# Patient Record
Sex: FEMALE | Race: WHITE | Employment: PART TIME | URBAN - METROPOLITAN AREA
[De-identification: names, ages, dates, MRNs, and addresses within clinical notes are randomized per-mention and may not be internally consistent; named-entity substitution may affect disease eponyms.]

---

## 2017-11-09 ENCOUNTER — HOSPITAL ENCOUNTER (OUTPATIENT)
Dept: GENERAL RADIOLOGY | Age: 62
Discharge: HOME | End: 2017-11-09
Attending: FAMILY MEDICINE
Payer: COMMERCIAL

## 2017-11-09 DIAGNOSIS — M54.50 LOW BACK PAIN: ICD-10-CM

## 2017-11-09 PROCEDURE — 72100 X-RAY EXAM L-S SPINE 2/3 VWS: CPT

## 2023-04-04 ENCOUNTER — HOSPITAL ENCOUNTER (OUTPATIENT)
Age: 68
End: 2023-04-04
Attending: SPECIALIST
Payer: MEDICARE

## 2023-04-05 ENCOUNTER — ANESTHESIA (OUTPATIENT)
Dept: ENDOSCOPY | Age: 68
End: 2023-04-05
Payer: MEDICARE

## 2023-04-05 ENCOUNTER — ANESTHESIA EVENT (OUTPATIENT)
Dept: ENDOSCOPY | Age: 68
End: 2023-04-05
Payer: MEDICARE

## 2023-04-05 PROCEDURE — 77030034675 HC CAP BRAVO PH W DEL SYS GVIM -C: Performed by: SPECIALIST

## 2023-04-05 PROCEDURE — 2709999900 HC NON-CHARGEABLE SUPPLY: Performed by: SPECIALIST

## 2023-04-05 PROCEDURE — 76040000019: Performed by: SPECIALIST

## 2023-04-05 PROCEDURE — 76060000031 HC ANESTHESIA FIRST 0.5 HR: Performed by: SPECIALIST

## 2023-04-05 RX ORDER — SODIUM CHLORIDE 9 MG/ML
INJECTION, SOLUTION INTRAVENOUS
Status: DISCONTINUED
Start: 2023-04-05 | End: 2023-04-05 | Stop reason: HOSPADM

## 2023-04-05 RX ORDER — FINASTERIDE 5 MG/1
TABLET, FILM COATED ORAL
Start: 2023-02-09

## 2023-04-05 RX ORDER — PROPOFOL 10 MG/ML
INJECTION, EMULSION INTRAVENOUS AS NEEDED
Status: DISCONTINUED
Start: 2023-04-05 | End: 2023-04-05 | Stop reason: HOSPADM

## 2023-04-05 RX ORDER — LIDOCAINE HYDROCHLORIDE 20 MG/ML
INJECTION, SOLUTION EPIDURAL; INFILTRATION; INTRACAUDAL; PERINEURAL AS NEEDED
Status: DISCONTINUED
Start: 2023-04-05 | End: 2023-04-05 | Stop reason: HOSPADM

## 2023-04-05 RX ORDER — MELOXICAM 15 MG/1
TABLET ORAL
Start: 2023-01-23

## 2023-04-05 RX ORDER — DEXMEDETOMIDINE HYDROCHLORIDE 100 UG/ML
INJECTION, SOLUTION INTRAVENOUS AS NEEDED
Status: DISCONTINUED
Start: 2023-04-05 | End: 2023-04-05 | Stop reason: HOSPADM

## 2023-04-05 RX ORDER — TEMAZEPAM 30 MG/1
CAPSULE ORAL
Start: 2023-03-31

## 2023-04-05 RX ORDER — SODIUM CHLORIDE 9 MG/ML
50 INJECTION, SOLUTION INTRAVENOUS CONTINUOUS
Status: DISCONTINUED
Start: 2023-04-05 | End: 2023-04-05 | Stop reason: HOSPADM

## 2023-04-05 RX ORDER — ARIPIPRAZOLE 2 MG/1
TABLET ORAL
Start: 2023-01-19

## 2023-04-05 RX ORDER — PROPOFOL 10 MG/ML
INJECTION, EMULSION INTRAVENOUS
Status: DISCONTINUED
Start: 2023-04-05 | End: 2023-04-05 | Stop reason: HOSPADM

## 2023-04-05 RX ORDER — NALOXONE HYDROCHLORIDE 0.4 MG/ML
0.4 INJECTION, SOLUTION INTRAMUSCULAR; INTRAVENOUS; SUBCUTANEOUS
Status: DISCONTINUED
Start: 2023-04-05 | End: 2023-04-05 | Stop reason: HOSPADM

## 2023-04-05 RX ORDER — MIDAZOLAM HYDROCHLORIDE 1 MG/ML
.25-5 INJECTION, SOLUTION INTRAMUSCULAR; INTRAVENOUS AS NEEDED
Status: DISCONTINUED
Start: 2023-04-05 | End: 2023-04-05 | Stop reason: HOSPADM

## 2023-04-05 RX ORDER — ALPRAZOLAM 0.25 MG/1
TABLET ORAL
Start: 2023-03-30

## 2023-04-05 RX ORDER — VILAZODONE HYDROCHLORIDE 10 MG/1
TABLET ORAL
Start: 2023-03-30

## 2023-04-05 RX ORDER — FLUMAZENIL 0.1 MG/ML
0.2 INJECTION INTRAVENOUS
Status: DISCONTINUED
Start: 2023-04-05 | End: 2023-04-05 | Stop reason: HOSPADM

## 2023-04-05 RX ORDER — DEXTROMETHORPHAN/PSEUDOEPHED 2.5-7.5/.8
1.2 DROPS ORAL
Status: DISCONTINUED
Start: 2023-04-05 | End: 2023-04-05 | Stop reason: HOSPADM

## 2023-04-05 RX ORDER — FENTANYL CITRATE 50 UG/ML
25 INJECTION, SOLUTION INTRAMUSCULAR; INTRAVENOUS AS NEEDED
Status: DISCONTINUED
Start: 2023-04-05 | End: 2023-04-05 | Stop reason: HOSPADM

## 2023-04-05 RX ADMIN — DEXMEDETOMIDINE HYDROCHLORIDE 2 MCG: 100 INJECTION, SOLUTION INTRAVENOUS at 11:04

## 2023-04-05 RX ADMIN — LIDOCAINE HYDROCHLORIDE 70 MG: 20 INJECTION, SOLUTION EPIDURAL; INFILTRATION; INTRACAUDAL; PERINEURAL at 11:04

## 2023-04-05 RX ADMIN — DEXMEDETOMIDINE HYDROCHLORIDE 2 MCG: 100 INJECTION, SOLUTION INTRAVENOUS at 11:06

## 2023-04-05 RX ADMIN — PROPOFOL 50 MCG/KG/MIN: 10 INJECTION, EMULSION INTRAVENOUS at 11:04

## 2023-04-05 RX ADMIN — DEXMEDETOMIDINE HYDROCHLORIDE 2 MCG: 100 INJECTION, SOLUTION INTRAVENOUS at 11:05

## 2023-04-05 RX ADMIN — PROPOFOL 40 MG: 10 INJECTION, EMULSION INTRAVENOUS at 11:12

## 2023-04-05 RX ADMIN — PROPOFOL 20 MG: 10 INJECTION, EMULSION INTRAVENOUS at 11:08

## 2023-04-05 RX ADMIN — PROPOFOL 40 MG: 10 INJECTION, EMULSION INTRAVENOUS at 11:10

## 2023-04-05 RX ADMIN — PROPOFOL 100 MG: 10 INJECTION, EMULSION INTRAVENOUS at 11:04

## 2023-04-05 RX ADMIN — DEXMEDETOMIDINE HYDROCHLORIDE 2 MCG: 100 INJECTION, SOLUTION INTRAVENOUS at 11:07

## 2023-04-05 RX ADMIN — SODIUM CHLORIDE: 9 INJECTION, SOLUTION INTRAVENOUS at 10:54

## 2023-04-05 NOTE — PROCEDURES
[address], [place]  [phone]      2023    Esophagogastroduodenoscopy (EGD) Procedure Note  Robert Rowland  : 7/10/1852  [address] [place] [address] Medical Record Number: [id number]      Indications:    Throat discomfort (her chief complaint to me this morning) as well as cough, for Bravo pH analysis to determine/quantify amount of acid exposure in esophagus. Referring Physician:  Hayder Schwartz MD  Anesthesia/Sedation:  Conscious sedation/deep sedation/monitored anesthesia -- see notes. Endoscopist:  Dr. Poonam Cai  Complications:  None  Estimated Blood Loss:  None    Permit:  The indications, risks, benefits and alternatives were reviewed with the patient or their decision maker who was provided an opportunity to ask questions and all questions were answered. The specific risks of esophagogastroduodenoscopy with conscious sedation were reviewed, including but not limited to anesthetic complication, bleeding, adverse drug reaction, missed lesion, infection, IV site reactions, and intestinal perforation which would lead to the need for surgical repair. Alternatives to EGD including radiographic imaging, observation without testing, or laboratory testing were reviewed as well as the limitations of those alternatives discussed. After considering the options and having all their questions answered, the patient or their decision maker provided both verbal and written consent to proceed. Procedure in Detail:  After obtaining informed consent, positioning of the patient in the left lateral decubitus position, and conduction of a pre-procedure pause or \"time out\" the endoscope was introduced into the mouth and advanced to the duodenum. A careful inspection was made, and findings or interventions are described below.     Findings:   Esophagus:As seen and biopsied previously, LA class A erosive esophagitis. A pH capsule is placed 5cm above the LES, photodocumentation of intra-esophageal location obtained after deployment. Stomach: normal   Duodenum/jejunum: normal      Specimens: none    Impression: Mild esophagitis, pH study initiated. Recommendations:  -Await results of pH analysis. Thank you for entrusting me with this patient's care. Please do not hesitate to contact me with any questions or if I can be of assistance with any of your other patients' GI needs. Signed By: Yaz Handy MD                        April 5, 2023     Surgical assistant none. Implants none unless specified.

## 2023-04-05 NOTE — ANESTHESIA PREPROCEDURE EVALUATION
Relevant Problems   No relevant active problems       Anesthetic History   No history of anesthetic complications            Review of Systems / Medical History  Patient summary reviewed, nursing notes reviewed and pertinent labs reviewed    Pulmonary  Within defined limits                 Neuro/Psych   Within defined limits           Cardiovascular  Within defined limits                Exercise tolerance: >4 METS     GI/Hepatic/Renal                Endo/Other             Other Findings              Physical Exam    Airway  Mallampati: I  TM Distance: > 6 cm  Neck ROM: normal range of motion   Mouth opening: Normal     Cardiovascular    Rhythm: regular  Rate: normal         Dental  No notable dental hx       Pulmonary  Breath sounds clear to auscultation               Abdominal         Other Findings            Anesthetic Plan    ASA: 1  Anesthesia type: MAC            Anesthetic plan and risks discussed with: Patient

## 2023-04-05 NOTE — H&P
Date: 2023 2:15 PM  Patient Name: Kei Sibley  Account #: [id number]  Gender: Female   (age): 1955 (74)  Provider:    MIGNON Baugh MD     Referring Physician:    Emily Gallardo MD  [address] [place], [address]  [phone] (phone)  [phone] (fax)     Chief Complaint:    Reflux     History of Present Illness:  81-year-old female who presents for follow-up. She was seen initially in 2021. She has a history of ilio cecal resection for cecal volvulus. She had issues with alteration in bowel habits characterized by increased frequency, cramping, gas. She was on chronic NSAIDs and had some epigastric pain. She also was found by PCP to have a slight increase in ALT. I repeated liver enzymes with again slight elevation in ALT. Viral hepatitis screening for hepatitis B and C were negative. Right upper quadrant ultrasound the liver, bile ducts, gallbladder were normal. She had an upper endoscopy in 2022 with LA class a esophagitis but otherwise normal. Biopsies from the stomach and duodenum were normal. Colonoscopy performed at the same time showed ileocolonic anastomosis but otherwise normal colon with negative biopsies for microscopic colitis. My suspicion at the time was her alteration of bowel habits could be related to small intestinal bacterial overgrowth or bile acid mediated diarrhea related to her prior surgery. Ultimately her symptoms improved with change in her other medications and she never required any specific therapy. From the standpoint of her bowel she has been doing reasonably well. She remains on daily meloxicam. Over the last several months she has had issues with what she thinks might be reflux. She has had chronic issues with cough which is worse after eating or laying down. She also has what she describes as an itching or sensation of sneezing or throat irritation or urge for throat clearing.  She denies globus or dysphagia. She occasionally feels like she has a sore throat. She saw ENT who said everything looked okay. She reports issues with heartburn. No regurgitation. She was told by her PCP to stop meloxicam but she has been unable to stop this medication. She was told to take Prilosec 20 mg over-the-counter daily but she says that she does this while her reflux symptoms get better she will develop worsening of her bowel symptoms. Currently she is taking Prilosec 20 g over-the-counter about 3 times a week. Past Medical History  Medical Conditions:   Anxiety disorder  Arthritis  Depression  Diabetes Mellitus  Hemorrhoids  Hep A  Surgical Procedures:   Right Colectomy  Tonsillectomy  Hysterectomy  Adenoidectomy  Dx Studies:   Colonoscopy, 2010  Colonoscopy, 2/1/2022, Previous Surgery in the colon, Normal mucosa in the neoterminal ileum and Normal mucosa in the whole colon. (Biopsy)  EGD, 2/1/2022, Grade A esophagitis compatible with mild distal esophagitis, Erythema in the antrum. (Biopsy) and Normal mucosa in the duodenum. (Biopsy)  Endoscopy, 10yrs ago  Medications:   Abilify 2 mg Take 1 by mouth once a day as directed  cholecalciferol (vitamin D3) 25 mcg (1,000 unit) Take 1 tablet by mouth once a day as directed  meloxicam 15 mg Take 1 tablet by mouth once a day as directed  Prilosec OTC 20 mg Take 1 by mouth once a day as directed  Rexulti 2 mg Take 1 tablet by mouth once a day as directed  vilazodone 10 mg Take 1 by mouth once a day as directed  Xanax 0.25 mg Take 1/2 by mouth twice a day as needed  Allergies:   Patient has no known allergies or drug allergies  Immunizations:   Influenza, seasonal, injectable, 10/1/2023  COVID Vaccine, 2022  pneumococcal polysaccharide PPV23, 2022  Flu vaccine, 10/1/2021  Covid vaccinationx2, 4/7/2021  Hep B  zoster  Social History  Alcohol:   Alcohol consumption: Weekly. Tobacco:   Never smoker  Drugs:   None  Exercise:   Exercise 3 or more times a week. Caffeine:   Daily.   Marital Status:         Occupation:    Audiologist     Family History   No history of Colon Cancer, Esophogeal Cancer, GI Cancers, IBD (Crohn's or UC), Liver disease  Brother: Diagnosed with colon polyps; Review of Systems:  Cardiovascular: Denies chest pain, irregular heart beat, palpitations, peripheral edema, syncope, Sweats. Constitutional: Presents suffers from fatigue. Denies fever, loss of appetite, weight gain, weight loss. ENMT: Denies nose bleeds, sore throat, hearing loss. Endocrine: Denies excessive thirst, heat intolerance. Eyes: Denies loss of vision. Gastrointestinal: Presents suffers from Bloating/gas, heartburn. Denies abdominal pain, abdominal swelling, change in bowel habits, constipation, diarrhea, jaundice, nausea, rectal bleeding, stomach cramps, vomiting, dysphagia, rectal pain, Stool incontinence, hematemesis. Genitourinary: Presents suffers from frequent urination. Denies dark urine, dysuria, hematuria, incontinence. Hematologic/Lymphatic: Denies easy bruising, prolonged bleeding. Integumentary: Presents suffers from itching. Denies rashes, sun sensitivity. Musculoskeletal: Presents suffers from back pain, stiffness. Denies arthritis, gout, joint pain, muscle weakness. Neurological: Denies dizziness, fainting, frequent headaches, memory loss. Psychiatric: Presents suffers from anxiety, depression, difficulty sleeping. Denies hallucinations, nervousness, panic attacks, paranoia. Respiratory: Denies cough, dyspnea, wheezing. Vital Signs:  BP  (mmHg)  Pulse  (bpm) Weight (lbs/oz) Height (ft/in) BMI Temp  164/94 79 153 / 6 5 / 5 25.52 98.1 (F)  Physical Exam:  Constitutional:  Appearance: No distress, appears comfortable. Communication: Understands/receives spoken information. Skin:  Inspection: No rash, no jaundice. Head/face: Inspection: Normacephalic, atraumatic.   Eyes:  Conjunctivae/lids: Normal.  ENMT:  External: Normal.  Hearing: Normal.  Neck:  Neck: Normal appearance, trachea midline. Respiratory:  Effort: Normal respiratory effort, comfortable, speaks in complete sentences. Gastrointestinal/Abdomen:  Abdomen: non-distended, nontender. Musculoskeletal:  Gait/station: normal.  Muscles: normal strength and tone, no atrophy or abnormal movements. Psychiatric:  Judgment/insight: Normal,normal judgement, normal insight. Orientation: oriented to time, space and person. Lab Results:   No Electronic Results  Impressions:   Heartburn  Chronic cough  Assessment:   New issues with question of acid reflux. Prior upper endoscopy with very mild erosive changes in the esophagus which could be consistent with acid reflux. She is unable to tolerate regular PPI from the standpoint of her bowel habits though this was helpful for her symptoms to some degree when she tried it. She also has a history of osteoporosis and she is worried about long-term use of PPI. Some of her symptoms are typical of acid reflux like heartburn but others are atypical potential extra esophageal symptoms including throat clearing and cough. Given that she is unable to tolerate PPI reliably I would offer endoscopic reevaluation with ambulatory pH monitoring by Bravo off of PPI for 7 days. She would like to try stopping meloxicam first to see if this is helpful which is fine but is not clear to me that this would resolve her symptoms. We discussed that an alternative to ambulatory pH testing would be to take PPI on a regular basis at a low dose understanding the potential risks and add a medication like a bile acid binder to see if this improves her bowels in conjunction with PPI use given her surgical history. She prefers endoscopic reevaluation with ambulatory pH monitoring which we will arrange.   Plan:   Education handout on BMI Healthy Weight  Education handout on Hypertension  Upper Endoscopy with Bravo PH - OFF all PPI / acid medications 7 days prior  Risk & Medical Necessity:    The level of medical decision making for this visit is moderate. The number and complexity of problems addressed are moderate. The amount and/or complexity of data to be reviewed and analyzed is minimal. The risk of complications and/or morbidity or mortality of patient management is moderate. Roland Larsen MD    Electronically signed on 2023 4:19:43 PM by MIGNON Goddard MD  [facility], MRN [id number],  1955 Follow Up, 2023

## 2023-04-05 NOTE — PROGRESS NOTES
Endoscopy discharge instructions have been reviewed and given to patient. The patient verbalized understanding and acceptance of instructions. Dr. Anthony Taylor discussed with patient procedure findings and next steps.

## 2023-04-05 NOTE — DISCHARGE INSTRUCTIONS
[address] LIZZY Sharif MD  [phone]      April 5, 2023     Brittany Hanson  YOB: 1955    ENDOSCOPY DISCHARGE INSTRUCTIONS    If there is redness at IV site you should apply warm compress to area. If redness or soreness persist contact Dr. Ana Luisa Sharif' or your primary care doctor. Gaseous discomfort may develop, but walking, belching will help relieve this. You may not operate a vehicle for 12 hours  You may not operate machinery or dangerous appliances for rest of today  You may not drink alcoholic beverages for 12 hours  Avoid making any critical decisions for 24 hours    DIET:  You may resume your normal diet, but some patients find that heavy or large meals may lead to indigestion or vomiting. I suggest a light meal as first food intake. MEDICATIONS:  The use of some over-the-counter pain medication may lead to bleeding after biopsies or other procedures you may have had done. Tylenol (also called acetaminophen) is safe to take and will not lead to bleeding. Based on the procedure you had today you may safely take aspirin or aspirin-like products for the next ten (10) days. ACTIVITY:  You may resume your normal household activities, but it is recommended that you spend the remainder of the day resting -  avoid any strenuous activity. CALL DR. Dinora Wright' OFFICE IF:  Increasing pain, nausea, vomiting  Abdominal distension (swelling)  Significant new or increased bleeding (oral or rectal)  Fever/Chills  Chest pain/shortness of breath                   Additional instructions: The examination today is the same as it was when Dr. Neha Javier performed previously, no new or dangerous findings. We have initiated a pH test, follow the instructions provided with the  and return that /recorder as advised. It was an honor to be your doctor today.   Please let me or my office staff know if you have any feedback about today's procedure.     Aleksandra Neely MD

## 2023-04-05 NOTE — INTERVAL H&P NOTE
Pre-Endoscopy H&P Update  Chief complaint/HPI/ROS:  The indication for the procedure, the patient's history and the patient's current medications are reviewed prior to the procedure and that data is reported on the H&P to which this document is attached. Any significant complaints with regard to organ systems will be noted, and if not mentioned then a review of systems is not contributory. Past Medical History:   Diagnosis Date    GERD (gastroesophageal reflux disease)       Past Surgical History:   Procedure Laterality Date    HX HYSTERECTOMY      HX TONSILLECTOMY      HX TOTAL COLECTOMY  2018    right colectomy     Social   Social History     Tobacco Use    Smoking status: Never    Smokeless tobacco: Never   Substance Use Topics    Alcohol use: Not on file     Comment: rarely      History reviewed. No pertinent family history. No Known Allergies   Prior to Admission Medications   Prescriptions Last Dose Informant Patient Reported? Taking? ALPRAZolam (XANAX) 0.25 mg tablet 4/4/2023  Yes Yes   ARIPiprazole (ABILIFY) 2 mg tablet 4/4/2023  Yes Yes   finasteride (PROSCAR) 5 mg tablet 4/4/2023  Yes Yes   meloxicam (MOBIC) 15 mg tablet 4/4/2023  Yes Yes   temazepam (RESTORIL) 30 mg capsule 4/4/2023  Yes Yes   vilazodone (VIIBRYD) 10 mg tab tablet 4/4/2023  Yes Yes      Facility-Administered Medications: None       PHYSICAL EXAM:  The patient is examined immediately prior to the procedure. Visit Vitals  BP (!) 144/83   Pulse 72   Temp 98.2 °F (36.8 °C)   Resp 12   Ht 5' 5\" (1.651 m)   Wt 68.9 kg (151 lb 14.4 oz)   SpO2 98%   Breastfeeding No   BMI 25.28 kg/m²     Gen: Appears comfortable, no distress. Pulm: comfortable respirations with no abnormal audible breath sounds  HEART: well perfused, no abnormal audible heart sounds  GI: abdomen flat. PLAN:  Informed consent discussion held, patient afforded an opportunity to ask questions and all questions answered.   After being advised of the risks, benefits, and alternatives, the patient requested that we proceed and indicated so on a written consent form. Will proceed with procedure as planned.   Sandee Roach MD

## 2023-04-05 NOTE — PROGRESS NOTES
Juanpablo Armenta  1955  [id number]    Situation:  Verbal report received from: Tian Sullivan RN   Procedure: Procedure(s):  BRAVO CLIP PLACEMENT  ESOPHAGOGASTRODUODENOSCOPY (EGD)    Background:    Preoperative diagnosis: GERD  Postoperative diagnosis: Esophagitis     :  Dr. Blaine Hernandez  Assistant(s): Endoscopy Technician-1: Rhonda Dickey  Endoscopy RN-1: Mio Alfredo RN    Specimens: * No specimens in log *  H. Pylori  no    Assessment:    Anesthesia gave intra-procedure sedation and medications, see anesthesia flow sheet no    Intravenous fluids: NS@ KVO     Vital signs stable     Abdominal assessment: round and soft     Recommendation:  Discharge patient per MD order.   Return to floor  Family or Friend   Permission to share finding with family or friend yes

## 2023-04-05 NOTE — PERIOP NOTES
Received recovery report from Anesthesia team, see anesthesia note. ABD remains soft and non-tender post procedure. Pt has no complaints at this time and tolerated the procedure well. Endoscope was pre-cleaned at bedside immediately following procedure by Juan greco. Post recovery report given to [facility].

## 2024-11-11 ENCOUNTER — HOSPITAL ENCOUNTER (OUTPATIENT)
Facility: HOSPITAL | Age: 69
Setting detail: RECURRING SERIES
Discharge: HOME | End: 2024-11-14
Payer: MEDICARE

## 2024-11-11 PROCEDURE — 97802 MEDICAL NUTRITION INDIV IN: CPT

## 2024-11-11 NOTE — PROGRESS NOTES
[facility] - Outpatient Nutrition Services at [facility]     Nutrition Assessment - Medical Nutrition Therapy   Outpatient Initial Evaluation         Patient Name: Basilia Cruz : 1955   Treatment Diagnosis: E78.2 (ICD-10-CM) - Mixed hyperlipidemia   E11.65 (ICD-10-CM) - Type 2 diabetes mellitus with hyperglycemia      Referral Source: Eleazar Rios MD Start of Care (SOC): 2024     In time:   930am             Out time:   1030am   Total Treatment Time (min):   60     Gender: female Age: 69 y.o.   Ht: 66 in Wt: 156 lb 70.8 kg   BMI: 25.2 (overweight) BMR   Male  BMR Female 1259     Past Medical History:  Hyperlipidemia  Hyperthyroidism  Type 2 dibetes  Multiple cystic thyroid nodules  Osteopenia  Partial colectomy for torsion of intesntine  Anxiety  Vitamin D deficiency  GERD  Osteopenia  Depression  Insomnia  Sleep aplnea  Reflux/heartburn  Total Hysterectomy  Fibromyalgia     Pertinent Medications:   Meloxicam 15 mg once a day  Rexulti 2 mg once a day - can cause increased appetite  Temazepam 30 mg once a day PRN  Vitamin D3 25 mcg once a day  Xanax 0.25 mg tablet, 0.5 tablet as needed   Biochemical Data:   2024  HbA1c 5.9%  Cholesterol 208 (H)  Triglycerides 71  HDL 95   (H)    3/2/24  HbA1c 6.1%    8/3/23   HbA1c 5.9%  TSH 2.34  T4 5.8  Vit D 45.7  GFR 90    23  HbA1c 6.5%     Assessment:   Pt is a 69 y.o. female seen today for hyperlipidemia and type 2 diabetes. She was diagnosed with diabetes on 2023. She has been to a dietitian before for diabetes to help reduce her HbA1c. Pt has difficulty with insomnia which leads to increased intake. She had a sleep study with minimal sleep apnea. Pt is retired. She is more sedentary during the day. Pt does not check her blood sugars. Pt reports having a prescription for Crestor that she plans to start soon. She has a high lipoprotein a ~140.     Wt hx: pt reports weight gain since starting antidepressant

## 2025-04-04 ENCOUNTER — OFFICE VISIT (OUTPATIENT)
Age: 70
End: 2025-04-04
Payer: MEDICARE

## 2025-04-04 DIAGNOSIS — E11.65 TYPE 2 DIABETES MELLITUS WITH HYPERGLYCEMIA, WITHOUT LONG-TERM CURRENT USE OF INSULIN (HCC): Primary | ICD-10-CM

## 2025-04-04 PROCEDURE — 97802 MEDICAL NUTRITION INDIV IN: CPT | Performed by: DIETITIAN, REGISTERED

## 2025-04-04 NOTE — PROGRESS NOTES
Kai Rodas - [facility]  Nutrition Assessment - Medical Nutrition Therapy  Outpatient Evaluation            Patient Name: Basilia Cruz : 1955   Treatment Diagnosis:  Type 2 DM with hyperglycemia E11.65; Mixed hyperlipidemia  E783.2   Referral Source: Terrence Linton MD Start of Care (SOC): 2025     In time:   8:15 am             Out time:   9:15 am   Total Treatment Time (min):   60     Gender: female Age: 69 y.o.     Ht: 66 in Wt: 161 lb 73.2 kg     Past Medical History:  Hyperlipidemia  Hyperthyroidism  Type 2 dibetes  Osteopenia  Partial colectomy for torsion of intesntine  Anxiety  Vitamin D deficiency  GERD  Osteopenia  Depression  Insomnia  Sleep aplnea  Reflux/heartburn  Fibromyalgia     Pertinent Medications:   Per patient not taking DM medications at this time. No medication changes since her last RD visit 24   Biochemical Data:    See Media for recent labs.     Food & Nutrition: 24 hour recall:  Not completed today. Pt shared that she struggles with eating over early am hours secondary to insomnia and side effects of her medications. Reports hx of hypoglycemia early in life, eating habits reflecting \"fast eating and frequent small amounts of food\". She is able to identify higher fiber food choices included in her diet: oatmeal, whole grain breads, fruit (small portions) and vegetables. Shared that her difficulties with diet management is related to her snacking that can be overnight and related to medications and mid afternoon.   Started using Lingo sensor - shared that she has been making diet changes.        Estimate Needs =  Basal Metabolic Rate (BMR) 1450       Calorie needs (using [place] St Jeor x 1.1 AF - 500 kcal): 1500 kcal/day  Protein needs (using 0.8-1 gm/kg Actual BW): 58 -75 g protein/day            Nutrition Diagnosis NB 1.1 Food and nutrition knowledge deficit R/T Type 2 dm and dyslipidemia as evidenced by labs.     Nutrition Intervention

## 2025-05-08 ENCOUNTER — OFFICE VISIT (OUTPATIENT)
Age: 70
End: 2025-05-08
Payer: MEDICARE

## 2025-05-08 DIAGNOSIS — E11.65 TYPE 2 DIABETES MELLITUS WITH HYPERGLYCEMIA, WITHOUT LONG-TERM CURRENT USE OF INSULIN (HCC): Primary | ICD-10-CM

## 2025-05-08 PROCEDURE — 97803 MED NUTRITION INDIV SUBSEQ: CPT

## 2025-05-08 NOTE — PROGRESS NOTES
[facility]  Nutrition Assessment - Medical Nutrition Therapy  Outpatient Evaluation            Patient Name: Basilia Cruz : 1955   Treatment Diagnosis: 1. Type 2 diabetes mellitus with hyperglycemia, without long-term current use of insulin ([facility])       Referral Source: Terrence Linton MD Start of Care (SOC): 2025     In time:   9:18 am             Out time:   10:07 am   Total Treatment Time (min):   49 minutes     Gender: female Age: 69 y.o.   Ht: Ht Readings from Last 3 Encounters:   No data found for Ht    66 in Wt: 151 lb (68.9 kg)    BMI: Overweight (BMI = 25.0-29.9)   IBW:   Patient weight not recorded         Past Medical History:  There is no problem list on file for this patient.       Pertinent Medications:   No current outpatient medications on file.   Biochemical Data:   No results found for: \"LABA1C\"  No results found for: \"NA\", \"K\", \"CL\", \"CO2\", \"BUN\", \"CREATININE\", \"GLUCOSE\", \"CALCIUM\", \"LABALBU\", \"BILITOT\", \"ALKPHOS\", \"AST\", \"ALT\", \"LABGLOM\", \"GFRAA\", \"AGRATIO\", \"GLOB\"      No results found for: \"CHOL\", \"TRIG\", \"HDL\", \"LDL\", \"VLDL\", \"CHOLHDLRATIO\"      Potential Barriers to Learning:                      None   Food Insecurity No   GI Symptom Triggers None     Food & Nutrition: 24 hour recall: Not completed today. Discussed specific meals or snacks.     25: Met with Ms. Cruz and her  today to discuss how to interpret CGM data. Ms. Cruz shared that she recently wore a Lingo CGM to track her BGs and had questions about how different foods influence BGs. She discussed meals where she had higher BG spikes and we found that she was likely not including enough protein and non starchy vegetables with her carbohydrates at those meals. We discussed the impacts of sugar sweetened beverages on BGs and she shared she does not usually drink them. Ms. Cruz has an additional Lingo at home that she plans to wear again after further education

## (undated) DEVICE — CANNULA CUSH AD W/ 14FT TBG

## (undated) DEVICE — HYPODERMIC SAFETY NEEDLE: Brand: MAGELLAN

## (undated) DEVICE — Device

## (undated) DEVICE — BLOCK BITE ENDOSCP AD 21 MM W/ DIL BLU LF DISP

## (undated) DEVICE — CUFF RMFG BP INF SZ 11 DISP -- LAWSON OEM ITEM 238915

## (undated) DEVICE — BLOCK BTE ENDOSCP AD 60FR 20MM -- MAXI BITE LF STRAP

## (undated) DEVICE — BAG BELONG PT PERS CLEAR HANDL

## (undated) DEVICE — CATH IV AUTOGRD BC YEL 24GA 19 -- INSYTE

## (undated) DEVICE — BLUNTFILL: Brand: MONOJECT

## (undated) DEVICE — YANKAUER,TAPERED BULBOUS TIP,W/O VENT: Brand: MEDLINE

## (undated) DEVICE — KIT COLON DUAL END BRSH W/LUBE -- CUSTOM BX/20 FORMERLY KS-18-20

## (undated) DEVICE — ELECTRODE,RADIOTRANSLUCENT,FOAM,3PK: Brand: MEDLINE

## (undated) DEVICE — 3M™ CUROS™ DISINFECTING CAP FOR NEEDLELESS CONNECTORS 270/CARTON 20 CARTONS/CASE CFF1-270: Brand: CUROS™

## (undated) DEVICE — TOWEL 4 PLY TISS 19X30 SUE WHT

## (undated) DEVICE — SET GRAV CK VLV NEEDLESS ST 3 GANGED 4WAY STPCOCK HI FLO 10

## (undated) DEVICE — SOLIDIFIER MEDC 1200ML -- CONVERT TO 356117

## (undated) DEVICE — SENSOR OXMTR SPO2 ADLT NELLCOR DISP

## (undated) DEVICE — ELECTRODE,RADIOTRANSLUCENT,FOAM,5PK: Brand: MEDLINE

## (undated) DEVICE — SYR 5ML 1/5 GRAD LL NSAF LF --

## (undated) DEVICE — 1200 GUARD II KIT W/5MM TUBE W/O VAC TUBE: Brand: GUARDIAN

## (undated) DEVICE — SYR 10ML LUER LOK 1/5ML GRAD --

## (undated) DEVICE — SYR 3ML LL TIP 1/10ML GRAD --

## (undated) DEVICE — CATH IV AUTOGRD BC PNK 20GA 25 -- INSYTE

## (undated) DEVICE — SYRINGE 50ML E/T

## (undated) DEVICE — NEONATAL-ADULT SPO2 SENSOR: Brand: NELLCOR